# Patient Record
Sex: FEMALE | Race: WHITE
[De-identification: names, ages, dates, MRNs, and addresses within clinical notes are randomized per-mention and may not be internally consistent; named-entity substitution may affect disease eponyms.]

---

## 2017-06-04 ENCOUNTER — HOSPITAL ENCOUNTER (EMERGENCY)
Dept: HOSPITAL 53 - M ED | Age: 57
Discharge: HOME | End: 2017-06-04
Payer: COMMERCIAL

## 2017-06-04 VITALS — DIASTOLIC BLOOD PRESSURE: 72 MMHG | SYSTOLIC BLOOD PRESSURE: 127 MMHG

## 2017-06-04 VITALS — BODY MASS INDEX: 22.88 KG/M2 | WEIGHT: 151 LBS | HEIGHT: 68 IN

## 2017-06-04 DIAGNOSIS — K13.79: Primary | ICD-10-CM

## 2017-06-04 LAB
ANION GAP SERPL CALC-SCNC: 3 MEQ/L (ref 8–16)
BASOPHILS # BLD AUTO: 0 K/MM3 (ref 0–0.2)
BASOPHILS NFR BLD AUTO: 0.6 % (ref 0–1)
BUN SERPL-MCNC: 15 MG/DL (ref 7–18)
CALCIUM SERPL-MCNC: 9 MG/DL (ref 8.5–10.1)
CHLORIDE SERPL-SCNC: 104 MEQ/L (ref 98–107)
CO2 SERPL-SCNC: 31 MEQ/L (ref 21–32)
CONTROL LINE MONO: (no result)
CREAT SERPL-MCNC: 0.94 MG/DL (ref 0.55–1.02)
EOSINOPHIL # BLD AUTO: 0.2 K/MM3 (ref 0–0.5)
EOSINOPHIL NFR BLD AUTO: 1.9 % (ref 0–3)
ERYTHROCYTE [DISTWIDTH] IN BLOOD BY AUTOMATED COUNT: 12.1 % (ref 11.5–14.5)
ERYTHROCYTE [SEDIMENTATION RATE] IN BLOOD BY WESTERGREN METHOD: 19 MM/HR (ref 0–30)
GFR SERPL CREATININE-BSD FRML MDRD: > 60 ML/MIN/{1.73_M2} (ref 51–?)
GLUCOSE SERPL-MCNC: 107 MG/DL (ref 70–105)
LARGE UNSTAINED CELL #: 0.1 K/MM3 (ref 0–0.4)
LARGE UNSTAINED CELL %: 1.6 % (ref 0–4)
LYMPHOCYTES # BLD AUTO: 2.2 K/MM3 (ref 1.5–4.5)
LYMPHOCYTES NFR BLD AUTO: 23.9 % (ref 24–44)
MCH RBC QN AUTO: 30.7 PG (ref 27–33)
MCHC RBC AUTO-ENTMCNC: 34.6 G/DL (ref 32–36.5)
MCV RBC AUTO: 88.8 FL (ref 80–96)
MONOCYTES # BLD AUTO: 0.4 K/MM3 (ref 0–0.8)
MONOCYTES NFR BLD AUTO: 5 % (ref 0–5)
NEUTROPHILS # BLD AUTO: 5.9 K/MM3 (ref 1.8–7.7)
NEUTROPHILS NFR BLD AUTO: 67 % (ref 36–66)
PLATELET # BLD AUTO: 383 K/MM3 (ref 150–450)
POTASSIUM SERPL-SCNC: 3.5 MEQ/L (ref 3.5–5.1)
SODIUM SERPL-SCNC: 138 MEQ/L (ref 136–145)
WBC # BLD AUTO: 8.8 K/MM3 (ref 4–10)

## 2017-06-04 PROCEDURE — 80048 BASIC METABOLIC PNL TOTAL CA: CPT

## 2017-06-04 PROCEDURE — 86038 ANTINUCLEAR ANTIBODIES: CPT

## 2017-06-04 PROCEDURE — 85025 COMPLETE CBC W/AUTO DIFF WBC: CPT

## 2017-06-04 PROCEDURE — 82164 ANGIOTENSIN I ENZYME TEST: CPT

## 2017-06-04 PROCEDURE — 71020: CPT

## 2017-06-04 PROCEDURE — 86140 C-REACTIVE PROTEIN: CPT

## 2017-06-04 PROCEDURE — 99283 EMERGENCY DEPT VISIT LOW MDM: CPT

## 2017-06-04 PROCEDURE — 70491 CT SOFT TISSUE NECK W/DYE: CPT

## 2017-06-04 PROCEDURE — 85652 RBC SED RATE AUTOMATED: CPT

## 2017-06-04 PROCEDURE — 86431 RHEUMATOID FACTOR QUANT: CPT

## 2017-06-04 PROCEDURE — 86308 HETEROPHILE ANTIBODY SCREEN: CPT

## 2017-06-04 NOTE — REP
Chest two views

 

HISTORY: Cough

 

Comparison: None

 

The lungs are clear.  The heart is normal in size.  The pulmonary vasculature is

normal in appearance.  The bony structure is intact.

 

IMPRESSION:  No acute disease.

 

 

Signed by

Brain Joyce MD 06/04/2017 04:49 P

## 2017-06-05 NOTE — REP
CT NECK WITH CONTRAST:

 

HISTORY:  Palate lesion.

 

CONTRAST:  Isovue 370, 75 mL.

 

There is slight asymmetry in the soft palate with the right side being slightly

larger than the left. This area is largely obscured by dental amalgam and not

well seen.  The hard palate is intact. The naso- and hypopharynx, larynx an

subglottic trachea are normal in appearance. The salivary and thyroid glands are

normal. An enlarged lymph node 1.2 cm in width is present in the right internal

jugular chain at the level of the amanda- and hypopharynx.  An enlarged lymph node

1.1 cm in width is present in the left internal jugular chain at the level of the

oropharynx.  Small lymph nodes less than 1 cm size are present in the posterior

triangles and submandibular areas.  Minimal degenerative change is present in the

cervical spine.  The lung apices are clear.  The visualized sinuses are clear.

 

IMPRESSION:

 

There is slight asymmetry in the soft palate with the right side being slightly

larger than the left.  This areas is largely obscured by dental amalgam.  A mass

cannot be completely excluded in this area. MR may be helpful for further

evaluation.

 

 

Signed by

Brain Joyce MD 06/05/2017 08:31 A
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
## 2017-08-11 ENCOUNTER — HOSPITAL ENCOUNTER (OUTPATIENT)
Dept: HOSPITAL 53 - M RAD | Age: 57
End: 2017-08-11
Attending: OTOLARYNGOLOGY
Payer: COMMERCIAL

## 2017-08-11 DIAGNOSIS — H90.41: Primary | ICD-10-CM

## 2017-08-11 NOTE — REP
MR NECK WITHOUT AND WITH CONTRAST:

 

HISTORY: Palate lesion.

 

CONTRAST: ProHance 13 mL

 

COMPARISON: CT 06/04/2017

 

A lingual tonsil is present. This is asymmetric with the right side being

slightly larger than the left. The naso-, amanda- and hypopharynx, larynx and

subglottic trachea are otherwise normal in appearance. The salivary and thyroid

glands are normal in size and signal intensity. An enlarged lymph node 1.1 cm in

width is present in the right internal jugular chain at the level of the

hypopharynx. Small lymph nodes less than 1 cm in size are present in the left

internal jugular chain, posterior triangles, submandibular and submental areas.

Mucosal thickening is present in the right sphenoid sinus.

 

IMPRESSION:

 

There is a slightly enlarged 1.1 cm lymph node in the right internal jugular

chain is decreased in size compared to the previous study.

 

 

Signed by

Brain Joyce MD 08/11/2017 03:39 P

## 2017-08-11 NOTE — REP
MR BRAIN WITHOUT AND WITH CONTRAST:

 

HISTORY: Sensory neural hearing loss.

 

CONTRAST: ProHance 13 mL

 

Scattered punctate areas of increased signal intensity on T2-weighted images are

present in the periventricular and subcortical white matter. This represents

small vessel ischemic disease. There is no intraparenchymal hemorrhage, infarct,

mass or midline shift. There is no abnormal enhancement. The ventricular system

is normal in appearance. There is no extracerebral collection. There is no

cerebellopontine angle mass. The inner ear structures are normal in appearance.

Minimal mucosal thickening is present in the right mastoid air cells. Mucosal

thickening is present in the right maxillary and sphenoid sinuses.

 

IMPRESSION:

 

Minimal small vessel ischemic disease.

 

 

Signed by

Brain Joyce MD 08/11/2017 03:39 P

## 2018-07-10 ENCOUNTER — HOSPITAL ENCOUNTER (OUTPATIENT)
Dept: HOSPITAL 53 - M LAB | Age: 58
End: 2018-07-10
Attending: PHYSICIAN ASSISTANT
Payer: COMMERCIAL

## 2018-07-10 DIAGNOSIS — Z13.220: ICD-10-CM

## 2018-07-10 DIAGNOSIS — R53.83: Primary | ICD-10-CM

## 2018-07-10 LAB
ALBUMIN/GLOBULIN RATIO: 1.15 (ref 1–1.93)
ALBUMIN: 4.6 GM/DL (ref 3.2–5.2)
ALKALINE PHOSPHATASE: 96 U/L (ref 45–117)
ALT SERPL W P-5'-P-CCNC: 31 U/L (ref 12–78)
ANION GAP: 7 MEQ/L (ref 8–16)
AST SERPL-CCNC: 37 U/L (ref 7–37)
BASO #: 0 10^3/UL (ref 0–0.2)
BASO %: 0.3 % (ref 0–1)
BILIRUBIN,TOTAL: 0.8 MG/DL (ref 0.2–1)
BLOOD UREA NITROGEN: 15 MG/DL (ref 7–18)
CALCIUM LEVEL: 9.7 MG/DL (ref 8.5–10.1)
CARBON DIOXIDE LEVEL: 30 MEQ/L (ref 21–32)
CHLORIDE LEVEL: 102 MEQ/L (ref 98–107)
CHOLEST SERPL-MCNC: 248 MG/DL (ref ?–200)
CHOLESTEROL RISK RATIO: 2 (ref ?–5)
CREATININE FOR GFR: 0.97 MG/DL (ref 0.55–1.3)
EOS #: 0 10^3/UL (ref 0–0.5)
EOSINOPHIL NFR BLD AUTO: 0.6 % (ref 0–3)
FREE T4: 0.75 NG/DL (ref 0.76–1.46)
GFR SERPL CREATININE-BSD FRML MDRD: > 60 ML/MIN/{1.73_M2} (ref 51–?)
GLUCOSE, FASTING: 79 MG/DL (ref 70–100)
HDLC SERPL-MCNC: 124 MG/DL (ref 40–?)
HEMATOCRIT: 41.1 % (ref 36–47)
HEMOGLOBIN: 14 G/DL (ref 12–15.5)
IMMATURE GRANULOCYTE %: 0.2 % (ref 0–3)
LDL CHOLESTEROL: 110.4 MG/DL (ref ?–100)
LYMPH #: 2.5 10^3/UL (ref 1.5–4.5)
LYMPH %: 41.1 % (ref 24–44)
MEAN CORPUSCULAR HEMOGLOBIN: 30.9 PG (ref 27–33)
MEAN CORPUSCULAR HGB CONC: 34.1 G/DL (ref 32–36.5)
MEAN CORPUSCULAR VOLUME: 90.7 FL (ref 80–96)
MONO #: 0.6 10^3/UL (ref 0–0.8)
MONO %: 9.7 % (ref 0–5)
NEUTROPHILS #: 3 10^3/UL (ref 1.8–7.7)
NEUTROPHILS %: 48.1 % (ref 36–66)
NONHDLC SERPL-MCNC: 124 MG/DL
NRBC BLD AUTO-RTO: 0 % (ref 0–0)
PLATELET COUNT, AUTOMATED: 323 10^3/UL (ref 150–450)
POTASSIUM SERUM: 3.7 MEQ/L (ref 3.5–5.1)
RED BLOOD COUNT: 4.53 10^6/UL (ref 4–5.4)
RED CELL DISTRIBUTION WIDTH: 12.4 % (ref 11.5–14.5)
SODIUM LEVEL: 139 MEQ/L (ref 136–145)
THYROID STIMULATING HORMONE: 2.34 UIU/ML (ref 0.36–3.74)
TOTAL PROTEIN: 8.6 GM/DL (ref 6.4–8.2)
TRIGLYCERIDES LEVEL: 68 MG/DL (ref ?–150)
WHITE BLOOD COUNT: 6.2 10^3/UL (ref 4–10)

## 2018-07-10 PROCEDURE — 84443 ASSAY THYROID STIM HORMONE: CPT

## 2018-07-13 LAB
B BURGDOR IGG+IGM SER-ACNC: <0.91 ISR (ref 0–0.9)
B BURGDOR IGM SER IA-ACNC: <0.8 INDEX (ref 0–0.79)

## 2018-08-30 ENCOUNTER — HOSPITAL ENCOUNTER (OUTPATIENT)
Dept: HOSPITAL 53 - M LAB REF | Age: 58
End: 2018-08-30
Attending: FAMILY MEDICINE
Payer: COMMERCIAL

## 2018-08-30 DIAGNOSIS — Z12.4: Primary | ICD-10-CM

## 2018-08-30 DIAGNOSIS — N95.2: ICD-10-CM

## 2018-09-06 LAB — HPV HYBRID CAPTURE II: (no result)

## 2019-07-11 ENCOUNTER — HOSPITAL ENCOUNTER (OUTPATIENT)
Dept: HOSPITAL 53 - M SFHCPLAZ | Age: 59
End: 2019-07-11
Attending: DERMATOLOGY
Payer: COMMERCIAL

## 2019-07-11 DIAGNOSIS — D22.39: Primary | ICD-10-CM

## 2019-07-11 PROCEDURE — 88305 TISSUE EXAM BY PATHOLOGIST: CPT

## 2019-07-29 ENCOUNTER — HOSPITAL ENCOUNTER (OUTPATIENT)
Dept: HOSPITAL 53 - M LAB | Age: 59
End: 2019-07-29
Attending: PHYSICIAN ASSISTANT
Payer: COMMERCIAL

## 2019-07-29 DIAGNOSIS — Z13.220: Primary | ICD-10-CM
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
## 2019-07-29 DIAGNOSIS — E55.9: ICD-10-CM

## 2019-07-29 DIAGNOSIS — S20.361A: ICD-10-CM

## 2019-07-29 DIAGNOSIS — Z13.29: ICD-10-CM

## 2019-07-29 LAB
ALBUMIN SERPL BCG-MCNC: 3.9 GM/DL (ref 3.2–5.2)
ALT SERPL W P-5'-P-CCNC: 27 U/L (ref 12–78)
BASOPHILS # BLD AUTO: 0 10^3/UL (ref 0–0.2)
BASOPHILS NFR BLD AUTO: 0.6 % (ref 0–1)
BILIRUB SERPL-MCNC: 0.4 MG/DL (ref 0.2–1)
BUN SERPL-MCNC: 11 MG/DL (ref 7–18)
CALCIUM SERPL-MCNC: 9.3 MG/DL (ref 8.5–10.1)
CHLORIDE SERPL-SCNC: 106 MEQ/L (ref 98–107)
CHOLEST SERPL-MCNC: 244 MG/DL (ref ?–200)
CHOLEST/HDLC SERPL: 2.05 {RATIO} (ref ?–5)
CO2 SERPL-SCNC: 32 MEQ/L (ref 21–32)
CREAT SERPL-MCNC: 0.99 MG/DL (ref 0.55–1.3)
EOSINOPHIL # BLD AUTO: 0.1 10^3/UL (ref 0–0.5)
EOSINOPHIL NFR BLD AUTO: 1.7 % (ref 0–3)
GFR SERPL CREATININE-BSD FRML MDRD: > 60 ML/MIN/{1.73_M2} (ref 51–?)
GLUCOSE SERPL-MCNC: 88 MG/DL (ref 70–100)
HCT VFR BLD AUTO: 41.6 % (ref 36–47)
HDLC SERPL-MCNC: 119 MG/DL (ref 40–?)
HGB BLD-MCNC: 13.9 G/DL (ref 12–15.5)
LDLC SERPL CALC-MCNC: 115 MG/DL (ref ?–100)
LYMPHOCYTES # BLD AUTO: 1.3 10^3/UL (ref 1.5–4.5)
LYMPHOCYTES NFR BLD AUTO: 36.8 % (ref 24–44)
MCH RBC QN AUTO: 31.1 PG (ref 27–33)
MCHC RBC AUTO-ENTMCNC: 33.4 G/DL (ref 32–36.5)
MCV RBC AUTO: 93.1 FL (ref 80–96)
MONOCYTES # BLD AUTO: 0.3 10^3/UL (ref 0–0.8)
MONOCYTES NFR BLD AUTO: 9.5 % (ref 0–5)
NEUTROPHILS # BLD AUTO: 1.9 10^3/UL (ref 1.8–7.7)
NEUTROPHILS NFR BLD AUTO: 51.4 % (ref 36–66)
NONHDLC SERPL-MCNC: 125 MG/DL
PLATELET # BLD AUTO: 278 10^3/UL (ref 150–450)
POTASSIUM SERPL-SCNC: 4.7 MEQ/L (ref 3.5–5.1)
PROT SERPL-MCNC: 7.5 GM/DL (ref 6.4–8.2)
RBC # BLD AUTO: 4.47 10^6/UL (ref 4–5.4)
SODIUM SERPL-SCNC: 140 MEQ/L (ref 136–145)
T4 FREE SERPL-MCNC: 0.69 NG/DL (ref 0.76–1.46)
TRIGL SERPL-MCNC: 50 MG/DL (ref ?–150)
TSH SERPL DL<=0.005 MIU/L-ACNC: 2.64 UIU/ML (ref 0.36–3.74)
WBC # BLD AUTO: 3.6 10^3/UL (ref 4–10)

## 2019-07-31 LAB
1,25(OH)2D3 SERPL-MCNC: 49.8 PG/ML (ref 19.9–79.3)
B BURGDOR IGG+IGM SER-ACNC: <0.91 ISR (ref 0–0.9)
B BURGDOR IGM SER IA-ACNC: <0.8 INDEX (ref 0–0.79)

## 2019-10-23 ENCOUNTER — HOSPITAL ENCOUNTER (OUTPATIENT)
Dept: HOSPITAL 53 - M LAB | Age: 59
End: 2019-10-23
Attending: PHYSICIAN ASSISTANT
Payer: COMMERCIAL

## 2019-10-23 DIAGNOSIS — E03.9: Primary | ICD-10-CM

## 2019-10-23 LAB
T4 FREE SERPL-MCNC: 0.77 NG/DL (ref 0.76–1.46)
THYROPEROXIDASE AB SERPL IA-ACNC: < 28 U/ML (ref ?–60)
TSH SERPL DL<=0.005 MIU/L-ACNC: 2.03 UIU/ML (ref 0.36–3.74)

## 2020-01-24 ENCOUNTER — HOSPITAL ENCOUNTER (OUTPATIENT)
Dept: HOSPITAL 53 - M LAB REF | Age: 60
End: 2020-01-24
Attending: PHYSICIAN ASSISTANT
Payer: COMMERCIAL

## 2020-01-24 DIAGNOSIS — E03.9: Primary | ICD-10-CM

## 2020-01-24 LAB
T4 FREE SERPL-MCNC: 1.01 NG/DL (ref 0.76–1.46)
THYROPEROXIDASE AB SERPL IA-ACNC: < 28 U/ML (ref ?–60)
TSH SERPL DL<=0.005 MIU/L-ACNC: 1.16 UIU/ML (ref 0.36–3.74)

## 2020-07-14 ENCOUNTER — HOSPITAL ENCOUNTER (OUTPATIENT)
Dept: HOSPITAL 53 - M LAB | Age: 60
End: 2020-07-14
Attending: PHYSICIAN ASSISTANT
Payer: COMMERCIAL
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
## 2020-07-14 DIAGNOSIS — E03.9: Primary | ICD-10-CM

## 2020-07-14 LAB
25(OH)D3 SERPL-MCNC: 85.4 NG/ML (ref 30–100)
ALBUMIN SERPL BCG-MCNC: 3.9 GM/DL (ref 3.2–5.2)
ALT SERPL W P-5'-P-CCNC: 25 U/L (ref 12–78)
BASOPHILS # BLD AUTO: 0 10^3/UL (ref 0–0.2)
BASOPHILS NFR BLD AUTO: 0.4 % (ref 0–1)
BILIRUB SERPL-MCNC: 0.5 MG/DL (ref 0.2–1)
BUN SERPL-MCNC: 13 MG/DL (ref 7–18)
CALCIUM SERPL-MCNC: 9.3 MG/DL (ref 8.5–10.1)
CHLORIDE SERPL-SCNC: 104 MEQ/L (ref 98–107)
CO2 SERPL-SCNC: 32 MEQ/L (ref 21–32)
CREAT SERPL-MCNC: 0.93 MG/DL (ref 0.55–1.3)
EOSINOPHIL # BLD AUTO: 0.1 10^3/UL (ref 0–0.5)
EOSINOPHIL NFR BLD AUTO: 1.1 % (ref 0–3)
GFR SERPL CREATININE-BSD FRML MDRD: > 60 ML/MIN/{1.73_M2} (ref 51–?)
GLUCOSE SERPL-MCNC: 97 MG/DL (ref 70–100)
HCT VFR BLD AUTO: 40 % (ref 36–47)
HGB BLD-MCNC: 13.3 G/DL (ref 12–15.5)
LYMPHOCYTES # BLD AUTO: 1.8 10^3/UL (ref 1.5–5)
LYMPHOCYTES NFR BLD AUTO: 38.5 % (ref 24–44)
MCH RBC QN AUTO: 31.1 PG (ref 27–33)
MCHC RBC AUTO-ENTMCNC: 33.3 G/DL (ref 32–36.5)
MCV RBC AUTO: 93.7 FL (ref 80–96)
MONOCYTES # BLD AUTO: 0.6 10^3/UL (ref 0–0.8)
MONOCYTES NFR BLD AUTO: 13.8 % (ref 0–5)
NEUTROPHILS # BLD AUTO: 2.1 10^3/UL (ref 1.5–8.5)
NEUTROPHILS NFR BLD AUTO: 46.2 % (ref 36–66)
PLATELET # BLD AUTO: 254 10^3/UL (ref 150–450)
POTASSIUM SERPL-SCNC: 4.1 MEQ/L (ref 3.5–5.1)
PROT SERPL-MCNC: 7.6 GM/DL (ref 6.4–8.2)
RBC # BLD AUTO: 4.27 10^6/UL (ref 4–5.4)
SODIUM SERPL-SCNC: 140 MEQ/L (ref 136–145)
T4 FREE SERPL-MCNC: 0.91 NG/DL (ref 0.76–1.46)
TSH SERPL DL<=0.005 MIU/L-ACNC: 0.74 UIU/ML (ref 0.36–3.74)
WBC # BLD AUTO: 4.6 10^3/UL (ref 4–10)

## 2021-08-20 ENCOUNTER — HOSPITAL ENCOUNTER (OUTPATIENT)
Dept: HOSPITAL 53 - M LABSMTC | Age: 61
End: 2021-08-20
Attending: ANESTHESIOLOGY
Payer: COMMERCIAL

## 2021-08-20 DIAGNOSIS — Z11.52: Primary | ICD-10-CM

## 2021-08-23 ENCOUNTER — HOSPITAL ENCOUNTER (OUTPATIENT)
Dept: HOSPITAL 53 - M LAB | Age: 61
End: 2021-08-23
Attending: OBSTETRICS & GYNECOLOGY
Payer: COMMERCIAL

## 2021-08-23 DIAGNOSIS — E34.9: Primary | ICD-10-CM

## 2021-08-23 DIAGNOSIS — F52.0: ICD-10-CM

## 2021-08-23 DIAGNOSIS — R53.83: ICD-10-CM

## 2021-08-23 LAB
ESTRADIOL SERPL-MCNC: 124.1 PG/ML
FSH SERPL-ACNC: 20.8 MIU/ML
LH SERPL-ACNC: 10.4 MIU/ML
PROGEST SERPL-MCNC: 0.21 NG/ML

## 2021-08-25 ENCOUNTER — HOSPITAL ENCOUNTER (OUTPATIENT)
Dept: HOSPITAL 53 - M OPP | Age: 61
Discharge: HOME | End: 2021-08-25
Attending: SURGERY
Payer: COMMERCIAL

## 2021-08-25 VITALS — WEIGHT: 150 LBS | BODY MASS INDEX: 22.73 KG/M2 | HEIGHT: 68 IN

## 2021-08-25 VITALS — DIASTOLIC BLOOD PRESSURE: 62 MMHG | SYSTOLIC BLOOD PRESSURE: 112 MMHG

## 2021-08-25 DIAGNOSIS — Z86.010: Primary | ICD-10-CM

## 2021-08-25 DIAGNOSIS — Z88.1: ICD-10-CM

## 2021-08-25 DIAGNOSIS — K58.9: ICD-10-CM

## 2021-08-25 DIAGNOSIS — D12.6: ICD-10-CM

## 2021-08-25 DIAGNOSIS — Z87.891: ICD-10-CM

## 2021-08-25 DIAGNOSIS — Z79.899: ICD-10-CM

## 2021-08-25 DIAGNOSIS — E03.9: ICD-10-CM

## 2021-08-25 LAB
TESTOST FREE SERPL-MCNC: 4.6 PG/ML (ref 0–4.2)
TESTOST SERPL-MCNC: 289 NG/DL (ref 4–50)

## 2021-08-25 NOTE — ROOR
________________________________________________________________________________

Patient Name: Jodee Leon             Procedure Date: 8/25/2021 8:30 AM

MRN: [id number]                          Account Number: [id number]

YOB: 1960                Age: 61

Room: [facility]                            Gender: Female

Note Status: Finalized                 

________________________________________________________________________________

 

Procedure:            Colonoscopy

Indications:          High risk colon cancer surveillance: Personal history of 

                      colonic polyps

Providers:            Leo J. Gosselin Jr, MD

Referring MD:         Karime SUAZO DO

Requesting Provider:  

Medicines:            Propofol per Anesthesia

Complications:        No immediate complications.

________________________________________________________________________________

Procedure:            Pre-Anesthesia Assessment:

                      - Prior to the procedure, a History and Physical was 

                      performed, and patient medications and allergies were 

                      reviewed. The patient is competent. The risks and 

                      benefits of the procedure and the sedation options and 

                      risks were discussed with the patient. All questions 

                      were answered and informed consent was obtained. Patient 

                      identification and proposed procedure were verified by 

                      the physician and the nurse in the pre-procedure area 

                      and in the procedure room. Mental Status Examination: 

                      alert and oriented. Airway Examination: normal 

                      oropharyngeal airway and neck mobility. Respiratory 

                      Examination: clear to auscultation. CV Examination: 

                      normal. ASA Grade Assessment: II - A patient with mild 

                      systemic disease. After reviewing the risks and 

                      benefits, the patient was deemed in satisfactory 

                      condition to undergo the procedure. The anesthesia plan 

                      was to use moderate sedation / analgesia (conscious 

                      sedation). Immediately prior to administration of 

                      medications, the patient was re-assessed for adequacy to 

                      receive sedatives. The heart rate, respiratory rate, 

                      oxygen saturations, blood pressure, adequacy of 

                      pulmonary ventilation, and response to care were 

                      monitored throughout the procedure. The physical status 

                      of the patient was re-assessed after the procedure.

                      The Colonoscope was introduced through the anus and 

                      advanced to the cecum, identified by appendiceal orifice 

                      and ileocecal valve. The colonoscopy was performed 

                      without difficulty. The patient tolerated the procedure 

                      well. The quality of the bowel preparation was adequate.

                                                                                

Findings:

     The rectum, sigmoid colon, descending colon, transverse colon, ascending 

     colon, cecum, appendiceal orifice and ileocecal valve appeared normal.

     A small polyp was found in the splenic flexure. The polyp was removed 

     with a cold snare. Resection and retrieval were complete.

                                                                                

Impression:           - The rectum, sigmoid colon, descending colon, 

                      transverse colon, ascending colon, cecum, appendiceal 

                      orifice and ileocecal valve are normal.

                      - One small polyp at the splenic flexure, removed with a 

                      cold snare. Resected and retrieved.

Recommendation:       - Discharge patient to home (ambulatory).

                      - Repeat colonoscopy in 5 years for surveillance.

                                                                                

Procedure Code(s):    --- Professional ---

                      46710, Colonoscopy, flexible; with removal of tumor(s), 

                      polyp(s), or other lesion(s) by snare technique

Diagnosis Code(s):    --- Professional ---

                      Z86.010, Personal history of colonic polyps

                      K63.5, Polyp of colon

 

CPT copyright 2019 [employer]. All rights reserved.

 

The codes documented in this report are preliminary and upon  review may 

be revised to meet current compliance requirements.

 

Leo Gosselin MD

_____________________

Leo J Gosselin Jr, MD

8/25/2021 8:59:11 AM

Electronically signed by Leo Gosselin Jr , MD

Number of Addenda: 0

 

Note Initiated On: 8/25/2021 8:30 AM

Estimated Blood Loss: Estimated blood loss: none.

## 2021-09-17 ENCOUNTER — HOSPITAL ENCOUNTER (EMERGENCY)
Dept: HOSPITAL 53 - M ED | Age: 61
Discharge: HOME | End: 2021-09-17
Payer: COMMERCIAL

## 2021-09-17 VITALS — BODY MASS INDEX: 23.54 KG/M2 | HEIGHT: 68 IN | WEIGHT: 155.32 LBS

## 2021-09-17 VITALS — SYSTOLIC BLOOD PRESSURE: 143 MMHG | DIASTOLIC BLOOD PRESSURE: 68 MMHG

## 2021-09-17 DIAGNOSIS — K58.9: ICD-10-CM

## 2021-09-17 DIAGNOSIS — Z88.8: ICD-10-CM

## 2021-09-17 DIAGNOSIS — S70.12XA: ICD-10-CM

## 2021-09-17 DIAGNOSIS — E03.9: ICD-10-CM
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
## 2021-09-17 DIAGNOSIS — Z79.899: ICD-10-CM

## 2021-09-17 DIAGNOSIS — S50.11XA: Primary | ICD-10-CM
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
## 2021-09-17 DIAGNOSIS — Z79.01: ICD-10-CM

## 2021-09-17 DIAGNOSIS — G61.0: ICD-10-CM
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
